# Patient Record
(demographics unavailable — no encounter records)

---

## 2025-02-20 NOTE — REASON FOR VISIT
[Other: ____] : [unfilled] [FreeTextEntry1] : 2/13/2025 Ramirez Zhang returns today for follow up. Last fall his mother-in-law passed, and then his father. He and his wife also caught Covid and while ill, he began to resort to his previous eating habits as he saw a grim future. As such, he has gained about 50 pounds and is now feeling unwell and out of breath with exertion. Today he is concerned that his symptoms may represent a cardiac condition and he is interested in evaluating his health and whether or not it would be safe for him to start exercising. He reports no discomfort in his chest and denies any lightheadedness or palpitations. He is now more optimistic about the future and reports his motivation level as high to lose weight to 350 pounds. Yesterday he saw his primary team and had labs drawn. They did discuss the possibility of GLP-1 therapies, however, he is somewhat resistant given his history of bipolar disorder and some published data regarding increased suicidality with the use of such medications.   PMD: Veronica Spencer, [facility]. T: [phone], F: [phone].

## 2025-02-20 NOTE — CARDIOLOGY SUMMARY
[de-identified] : 2/13/2025 - sinus rhythm at 72 bpm, incomplete RBBB [de-identified] : 2/9/2022 - pericardium not well visualized, hyperdynamic LV systolic function, LVEF 70-75%

## 2025-02-20 NOTE — REASON FOR VISIT
[Other: ____] : [unfilled] [FreeTextEntry1] : 2/13/2025 Ramirez Zhang returns today for follow up. Last fall his mother-in-law passed, and then his father. He and his wife also caught Covid and while ill, he began to resort to his previous eating habits as he saw a grim future. As such, he has gained about 50 pounds and is now feeling unwell and out of breath with exertion. Today he is concerned that his symptoms may represent a cardiac condition and he is interested in evaluating his health and whether or not it would be safe for him to start exercising. He reports no discomfort in his chest and denies any lightheadedness or palpitations. He is now more optimistic about the future and reports his motivation level as high to lose weight to 350 pounds. Yesterday he saw his primary team and had labs drawn. They did discuss the possibility of GLP-1 therapies, however, he is somewhat resistant given his history of bipolar disorder and some published data regarding increased suicidality with the use of such medications.   PMD: Veronica Spencer, [facility]. T: [phone], F: [phone].

## 2025-02-20 NOTE — CARDIOLOGY SUMMARY
[de-identified] : 2/13/2025 - sinus rhythm at 72 bpm, incomplete RBBB [de-identified] : 2/9/2022 - pericardium not well visualized, hyperdynamic LV systolic function, LVEF 70-75%

## 2025-02-20 NOTE — CARDIOLOGY SUMMARY
[de-identified] : 2/13/2025 - sinus rhythm at 72 bpm, incomplete RBBB [de-identified] : 2/9/2022 - pericardium not well visualized, hyperdynamic LV systolic function, LVEF 70-75%

## 2025-02-20 NOTE — HISTORY OF PRESENT ILLNESS
[FreeTextEntry1] : 54 year-old gentleman with a complex past medical history including sensation in early 2022 with gross hematuria.  CT of abdomen pelvis results revealed the incidental finding of a large peripherally calcified cystic structure entering the right ventricle with prominent lymphadenopathy in the region external iliacs aorta and inferior vena cava.  Echo revealed external compression of the right ventricle.  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  March 24, he underwent complicated repair of adherent pericardial mass on cardiopulmonary bypass.  On March 25, 2022, he underwent mediastinal exploration and evaluation of pericardial tamponade and hemothorax with control of bleeding.  PET/CT on 9/12/2022 revealed newly indeterminant subcentimeter nodules right lung base.  There is stable right lower lobe paramediastinal nodule there was slightly increased thoracic supraclavicular and left axillary, subpectoral and right axillary adenopathy.  Several slightly increased pelvic nodes and right common iliac chain nodes as well.  Splenomegaly.  He states that the etiology of the mediastinal findings were attributed by 1 physician at [facility] to be related to CLL and when he saw an oncologist for second opinion at [place], to COVID-19  Followed at [facility] and no chemotherapy advised.  Follow-up CT scanning is scheduled for September.  Presents now with an approximately 1-week history of fleeting left upper parasternal pains laterally lasting a few seconds and occurring randomly unrelated to activity position, meals or respiration.  Symptoms, however, are reminiscent of symptoms he had at the time of identification of the pericardial mass.  No c/o throat, jaw, arm or upper back discomfort.  No dyspnea, orthopnea or PND.  No palpitations, dizziness or syncope.  No edema or claudication.  He continues to work as a sanitation .  No physical work.  Tolerates house and yard work and stair climbing without any effort provoke symptoms including this past week.

## 2025-02-20 NOTE — REASON FOR VISIT
[Other: ____] : [unfilled] [FreeTextEntry1] : 2/13/2025 Ramirez Zhang returns today for follow up. Last fall his mother-in-law passed, and then his father. He and his wife also caught Covid and while ill, he began to resort to his previous eating habits as he saw a grim future. As such, he has gained about 50 pounds and is now feeling unwell and out of breath with exertion. Today he is concerned that his symptoms may represent a cardiac condition and he is interested in evaluating his health and whether or not it would be safe for him to start exercising. He reports no discomfort in his chest and denies any lightheadedness or palpitations. He is now more optimistic about the future and reports his motivation level as high to lose weight to 350 pounds. Yesterday he saw his primary team and had labs drawn. They did discuss the possibility of GLP-1 therapies, however, he is somewhat resistant given his history of bipolar disorder and some published data regarding increased suicidality with the use of such medications.   PMD: Veronica Spencer, [facility]. T: [phone], F: [phone].